# Patient Record
Sex: FEMALE | Race: WHITE | NOT HISPANIC OR LATINO | Employment: OTHER | URBAN - METROPOLITAN AREA
[De-identification: names, ages, dates, MRNs, and addresses within clinical notes are randomized per-mention and may not be internally consistent; named-entity substitution may affect disease eponyms.]

---

## 2020-06-09 ENCOUNTER — OFFICE VISIT (OUTPATIENT)
Dept: FAMILY MEDICINE CLINIC | Facility: CLINIC | Age: 60
End: 2020-06-09

## 2020-06-09 VITALS
HEIGHT: 64 IN | SYSTOLIC BLOOD PRESSURE: 122 MMHG | DIASTOLIC BLOOD PRESSURE: 68 MMHG | BODY MASS INDEX: 32.23 KG/M2 | OXYGEN SATURATION: 97 % | HEART RATE: 60 BPM | TEMPERATURE: 98 F | WEIGHT: 188.8 LBS

## 2020-06-09 DIAGNOSIS — N76.0 ACUTE VAGINITIS: Primary | ICD-10-CM

## 2020-06-09 DIAGNOSIS — A60.00 GENITAL HERPES SIMPLEX, UNSPECIFIED SITE: ICD-10-CM

## 2020-06-09 LAB
BACTERIAL VAGINOSIS VAG-IMP: NEGATIVE
BILIRUB BLD-MCNC: NEGATIVE MG/DL
EXPIRATION DATE: NORMAL
GLUCOSE UR STRIP-MCNC: NEGATIVE MG/DL
KETONES UR QL: NEGATIVE
LEUKOCYTE EST, POC: ABNORMAL
Lab: NORMAL
NITRITE UR-MCNC: NEGATIVE MG/ML
PH UR: 6 [PH] (ref 5–8)
PROT UR STRIP-MCNC: NEGATIVE MG/DL
RBC # UR STRIP: NEGATIVE /UL
SP GR UR: 1.03 (ref 1–1.03)
UROBILINOGEN UR QL: NORMAL

## 2020-06-09 PROCEDURE — 87510 GARDNER VAG DNA DIR PROBE: CPT | Performed by: NURSE PRACTITIONER

## 2020-06-09 PROCEDURE — 81003 URINALYSIS AUTO W/O SCOPE: CPT | Performed by: NURSE PRACTITIONER

## 2020-06-09 PROCEDURE — 99213 OFFICE O/P EST LOW 20 MIN: CPT | Performed by: NURSE PRACTITIONER

## 2020-06-09 RX ORDER — VALACYCLOVIR HYDROCHLORIDE 1 G/1
1000 TABLET, FILM COATED ORAL 2 TIMES DAILY
Qty: 10 TABLET | Refills: 0 | Status: SHIPPED | OUTPATIENT
Start: 2020-06-09 | End: 2020-08-24 | Stop reason: SDUPTHER

## 2020-06-09 NOTE — PATIENT INSTRUCTIONS
Herpes Simplex Test  Why am I having this test?  The herpes simplex test is used to check for an infection with the herpes simplex virus (HSV). There are two common types of HSV:  · Type 1 (HSV1) often causes cold sores on or around the mouth and sometimes on or around the eyes.  · Type 2 (HSV2) is a sexually transmitted infection that causes sores in and around the genitals.  You may need to have an HSV test if:  · Your health care provider thinks that you may have an HSV infection.  · You have a weakened body defense system (immune system) and you have sores around your mouth or genitals that look like HSV eruptions.  · You have sex with multiple partners, or your partner has genital herpes.  · You are pregnant, have herpes, and are expecting to deliver a baby vaginally in the next 6-8 weeks.  What is being tested?  There are two types of herpes simplex tests:  · Blood test. This test checks the sample for:  ? HSV antibodies. Antibodies are proteins that your body makes to help fight infection. This test checks whether antibodies against HSV are in your blood.  ? HSV antigens. This checks for the presence of the HSV virus (antigen) in your blood.  · Culture test. This test checks for the virus in a sample of fluid from an open sore. Culture tests take several days to complete but are very accurate.  What kind of sample is taken?         Samples will be collected according to the type of tests your health care provider orders.  · For the blood tests, a blood sample is usually collected by inserting a needle into a blood vessel.  · For a culture test, the sample is usually collected by swabbing the fluid that is coming from an open sore during an active infection (outbreak).  How are the results reported?  Your test results will be reported as either positive or negative. For this test, normal results are:  · Negative for HSV virus or antibodies in your blood.  · Negative for HSV virus in cultured fluid.  Sometimes,  the test results may report that a condition is present when it is not present (false-positive result).  Sometimes, the test results may report that a condition is not present when it is present (false-negative result).  What do the results mean?  · A positive result may indicate that you have an active HSV infection. The presence of HSV1 or HSV2 antigens or antibodies in your blood may indicate an active HSV infection.  · A negative result means that you do not have HSV1 or HSV2 virus in your blood. This may mean that you do not have HSV infection.  Talk with your health care provider about what your results mean.  Questions to ask your health care provider  Ask your health care provider, or the department that is doing the test:  · When will my results be ready?  · How will I get my results?  · What are my treatment options?  · What other tests do I need?  · What are my next steps?  Summary  · You may have this test if your health care provider suspects that you have a herpes simplex virus (HSV) infection.  · Type 1 (HSV1) often causes cold sores on or around the mouth and sometimes on or around the eyes. Type 2 (HSV2) is a sexually transmitted infection that causes sores in and around the genitals.  · The test may be done using a blood sample or a sample of fluid from an open sore.  · A positive result may mean that you have an active HSV infection. A negative result means that you probably do not have an active infection. Talk with your health care provider about what your results mean.  This information is not intended to replace advice given to you by your health care provider. Make sure you discuss any questions you have with your health care provider.  Document Released: 01/20/2006 Document Revised: 11/30/2018 Document Reviewed: 11/06/2018  Elsevier Patient Education © 2020 [employer]

## 2020-06-09 NOTE — PROGRESS NOTES
Subjective   Bebe Alfonso is a 60 y.o. female.     Chief Complaint   Patient presents with   • Vaginitis        History of Present Illness       Patient is here today with complaint of first noticed something going on in groin last Wednesday.  Vaginal Lip was swollen last Wednesday.  Then next day had BM and felt like swelling and tenderness went away. Then on Friday noticed something raw.  Feels like it looks different today than it did Friday. Took a picture on Friday and has to show me.     Denies vaginal discharge or itching.  Does report irritation.       OTC: neosporin.  Just feels like symptoms are outside on labial wall, not feeling like having any inside.        The following portions of the patient's history were reviewed and updated as appropriate: allergies, current medications, past family history, past medical history, past social history, past surgical history and problem list.    History reviewed. No pertinent past medical history.    Past Surgical History:   Procedure Laterality Date   • HYSTERECTOMY         Family History   Problem Relation Age of Onset   • Cancer Mother    • Anuerysm Father        Social History     Socioeconomic History   • Marital status:      Spouse name: Not on file   • Number of children: Not on file   • Years of education: Not on file   • Highest education level: Not on file   Tobacco Use   • Smoking status: Never Smoker   • Smokeless tobacco: Never Used   Substance and Sexual Activity   • Alcohol use: Yes     Alcohol/week: 1.0 standard drinks     Types: 1 Glasses of wine per week   • Drug use: Never         Current Outpatient Medications:   •  Bacitracin-Polymyxin B (NEOSPORIN) [id number] UNIT/GM ointment, Apply  topically., Disp: , Rfl:   •  Tetrahydrozoline-Zn Sulfate (EYE DROPS ALLERGY RELIEF OP), Apply  to eye(s) as directed by provider., Disp: , Rfl:   •  valACYclovir (VALTREX) 1000 MG tablet, Take 1 tablet by mouth 2 (Two) Times a Day., Disp: 10 tablet, Rfl:  "0    Review of Systems   Constitutional: Negative for fatigue and fever.   Respiratory: Positive for chest tightness (when breathing in on R side of chest). Negative for cough, shortness of breath and wheezing.    Cardiovascular: Negative for chest pain.   Gastrointestinal: Negative for abdominal pain, constipation, diarrhea, nausea and vomiting.   Genitourinary: Positive for frequency, genital sores (labia) and urgency. Negative for dysuria, vaginal bleeding and vaginal discharge. Vaginal pain: spot on labia.       Objective   Vitals:    06/09/20 0841   BP: 122/68   Pulse: 60   Temp: 98 °F (36.7 °C)   SpO2: 97%   Weight: 85.6 kg (188 lb 12.8 oz)   Height: 162.6 cm (64\")     Body mass index is 32.41 kg/m².  Physical Exam   Constitutional: She is oriented to person, place, and time. She appears well-developed and well-nourished.   Genitourinary:       Pelvic exam was performed with patient supine. There is lesion (#1 linear 3 lesions open, white, with pinkened and red karen area. no drainage noted) on the right labia.   Neurological: She is alert and oriented to person, place, and time.   Psychiatric: She has a normal mood and affect. Her behavior is normal. Judgment and thought content normal.         Assessment/Plan   Bebe was seen today for vaginitis.    Diagnoses and all orders for this visit:    Acute vaginitis  -     POC Urinalysis Dipstick, Automated  -     POCT Bacterial Vaginosis Rapid Test  -     Urine Culture - Urine, Urine, Clean Catch  -     Hepatitis panel, acute  -     HIV-1/O/2 ANTIGEN/ANTIBODY, 4TH GENERATION  -     HSV 1 and 2 IgM, Abs, Indirect  -     HSV 1 and 2-Specific Ab, IgG  -     RPR  -     Trichomonas vaginalis, PCR - , Urine, Clean Catch  -     Chlamydia trachomatis, Neisseria gonorrhoeae, PCR - Urine, Cervix    Genital herpes simplex, unspecified site    Other orders  -     valACYclovir (VALTREX) 1000 MG tablet; Take 1 tablet by mouth 2 (Two) Times a Day.      After physical assessment " patient states her ex- had diagnosis of herpes, but she didn't think she needed to be concerned because never had symptoms.  She would like testing for everything since this is shocking for her.  Denies symptoms.      We will go on and treat for presumptive herpes.  We discussed the diagnosis and treatment at length.      Follow up as needed.             Patient Instructions   Herpes Simplex Test  Why am I having this test?  The herpes simplex test is used to check for an infection with the herpes simplex virus (HSV). There are two common types of HSV:  · Type 1 (HSV1) often causes cold sores on or around the mouth and sometimes on or around the eyes.  · Type 2 (HSV2) is a sexually transmitted infection that causes sores in and around the genitals.  You may need to have an HSV test if:  · Your health care provider thinks that you may have an HSV infection.  · You have a weakened body defense system (immune system) and you have sores around your mouth or genitals that look like HSV eruptions.  · You have sex with multiple partners, or your partner has genital herpes.  · You are pregnant, have herpes, and are expecting to deliver a baby vaginally in the next 6-8 weeks.  What is being tested?  There are two types of herpes simplex tests:  · Blood test. This test checks the sample for:  ? HSV antibodies. Antibodies are proteins that your body makes to help fight infection. This test checks whether antibodies against HSV are in your blood.  ? HSV antigens. This checks for the presence of the HSV virus (antigen) in your blood.  · Culture test. This test checks for the virus in a sample of fluid from an open sore. Culture tests take several days to complete but are very accurate.  What kind of sample is taken?         Samples will be collected according to the type of tests your health care provider orders.  · For the blood tests, a blood sample is usually collected by inserting a needle into a blood vessel.  · For  a culture test, the sample is usually collected by swabbing the fluid that is coming from an open sore during an active infection (outbreak).  How are the results reported?  Your test results will be reported as either positive or negative. For this test, normal results are:  · Negative for HSV virus or antibodies in your blood.  · Negative for HSV virus in cultured fluid.  Sometimes, the test results may report that a condition is present when it is not present (false-positive result).  Sometimes, the test results may report that a condition is not present when it is present (false-negative result).  What do the results mean?  · A positive result may indicate that you have an active HSV infection. The presence of HSV1 or HSV2 antigens or antibodies in your blood may indicate an active HSV infection.  · A negative result means that you do not have HSV1 or HSV2 virus in your blood. This may mean that you do not have HSV infection.  Talk with your health care provider about what your results mean.  Questions to ask your health care provider  Ask your health care provider, or the department that is doing the test:  · When will my results be ready?  · How will I get my results?  · What are my treatment options?  · What other tests do I need?  · What are my next steps?  Summary  · You may have this test if your health care provider suspects that you have a herpes simplex virus (HSV) infection.  · Type 1 (HSV1) often causes cold sores on or around the mouth and sometimes on or around the eyes. Type 2 (HSV2) is a sexually transmitted infection that causes sores in and around the genitals.  · The test may be done using a blood sample or a sample of fluid from an open sore.  · A positive result may mean that you have an active HSV infection. A negative result means that you probably do not have an active infection. Talk with your health care provider about what your results mean.  This information is not intended to replace  advice given to you by your health care provider. Make sure you discuss any questions you have with your health care provider.  Document Released: 01/20/2006 Document Revised: 11/30/2018 Document Reviewed: 11/06/2018  Elsevier Patient Education © 2020 [employer]vier Inc.

## 2020-06-10 RX ORDER — VALACYCLOVIR HYDROCHLORIDE 1 G/1
TABLET, FILM COATED ORAL
Qty: 10 TABLET | Refills: 0 | OUTPATIENT
Start: 2020-06-10

## 2020-06-11 LAB
BACTERIA UR CULT: NORMAL
BACTERIA UR CULT: NORMAL
C TRACH RRNA SPEC QL NAA+PROBE: NEGATIVE
HAV IGM SERPL QL IA: NEGATIVE
HBV CORE IGM SERPL QL IA: NEGATIVE
HBV SURFACE AG SERPL QL IA: NEGATIVE
HCV AB S/CO SERPL IA: <0.1 S/CO RATIO (ref 0–0.9)
HIV 1+2 AB+HIV1 P24 AG SERPL QL IA: NON REACTIVE
HSV1 IGG SER IA-ACNC: <0.91 INDEX (ref 0–0.9)
HSV1 IGM TITR SER IF: NORMAL TITER
HSV2 IGG SER IA-ACNC: 3.78 INDEX (ref 0–0.9)
HSV2 IGG SERPL QL IA: POSITIVE
HSV2 IGM TITR SER IF: NORMAL TITER
N GONORRHOEA RRNA SPEC QL NAA+PROBE: NEGATIVE
RPR SER QL: NORMAL
T VAGINALIS DNA SPEC QL NAA+PROBE: NEGATIVE

## 2020-06-30 ENCOUNTER — TELEPHONE (OUTPATIENT)
Dept: FAMILY MEDICINE CLINIC | Facility: CLINIC | Age: 60
End: 2020-06-30

## 2020-07-02 ENCOUNTER — TELEPHONE (OUTPATIENT)
Dept: FAMILY MEDICINE CLINIC | Facility: CLINIC | Age: 60
End: 2020-07-02

## 2020-07-02 NOTE — TELEPHONE ENCOUNTER
Pt aware. She wishes to cancel her appointment at this time. I have cancelled it and instructed her to call us when she reschedules.

## 2020-07-02 NOTE — TELEPHONE ENCOUNTER
Lab work is only performed based on risk factors or medical complaints.  Without seeing the patient, I do not know what those risk factors and medical complaints might be and cannot just order labs.  She needs to be seen.

## 2020-07-02 NOTE — TELEPHONE ENCOUNTER
PT CALLED AND WOULD LIKE TO KNOW IF SHE CAN COME IN JUST FOR LAB WORK INSTEAD OF HAVING HER PHYSICAL THAT IS SCHEDULE FOR ON 7/14? PT SAYS THAT SHE IS NOT HAVING ANY CONCERNS RIGHT NOW.     CALL BACK # [phone]

## 2020-08-24 RX ORDER — VALACYCLOVIR HYDROCHLORIDE 1 G/1
1000 TABLET, FILM COATED ORAL 2 TIMES DAILY
Qty: 10 TABLET | Refills: 0 | Status: SHIPPED | OUTPATIENT
Start: 2020-08-24 | End: 2021-03-18

## 2020-08-24 NOTE — TELEPHONE ENCOUNTER
PATIENT WAS SEEN IN June FOR GENITAL HERPES, SHE HAS HAD ANOTHER OUTBREAK AND IS NEEDING A REFILL.

## 2020-08-25 RX ORDER — VALACYCLOVIR HYDROCHLORIDE 1 G/1
TABLET, FILM COATED ORAL
Qty: 10 TABLET | Refills: 0 | OUTPATIENT
Start: 2020-08-25

## 2021-03-18 ENCOUNTER — OFFICE VISIT (OUTPATIENT)
Dept: FAMILY MEDICINE CLINIC | Facility: CLINIC | Age: 61
End: 2021-03-18

## 2021-03-18 VITALS
HEIGHT: 64 IN | BODY MASS INDEX: 32.3 KG/M2 | TEMPERATURE: 97.5 F | OXYGEN SATURATION: 93 % | WEIGHT: 189.2 LBS | DIASTOLIC BLOOD PRESSURE: 64 MMHG | SYSTOLIC BLOOD PRESSURE: 128 MMHG | HEART RATE: 63 BPM

## 2021-03-18 DIAGNOSIS — K92.1 MELENA: ICD-10-CM

## 2021-03-18 DIAGNOSIS — R14.0 BLOATING: ICD-10-CM

## 2021-03-18 DIAGNOSIS — R19.7 DIARRHEA, UNSPECIFIED TYPE: Primary | ICD-10-CM

## 2021-03-18 DIAGNOSIS — K29.01 ACUTE GASTRITIS WITH HEMORRHAGE, UNSPECIFIED GASTRITIS TYPE: ICD-10-CM

## 2021-03-18 LAB
BILIRUB BLD-MCNC: NEGATIVE MG/DL
GLUCOSE UR STRIP-MCNC: NEGATIVE MG/DL
KETONES UR QL: NEGATIVE
LEUKOCYTE EST, POC: NEGATIVE
NITRITE UR-MCNC: NEGATIVE MG/ML
PH UR: 6 [PH] (ref 5–8)
PROT UR STRIP-MCNC: NEGATIVE MG/DL
RBC # UR STRIP: NEGATIVE /UL
SP GR UR: 1.02 (ref 1–1.03)
UROBILINOGEN UR QL: NORMAL

## 2021-03-18 PROCEDURE — 99214 OFFICE O/P EST MOD 30 MIN: CPT | Performed by: FAMILY MEDICINE

## 2021-03-18 PROCEDURE — 81003 URINALYSIS AUTO W/O SCOPE: CPT | Performed by: FAMILY MEDICINE

## 2021-03-18 RX ORDER — CLINDAMYCIN HYDROCHLORIDE 150 MG/1
150 CAPSULE ORAL 4 TIMES DAILY
COMMUNITY
End: 2021-06-16

## 2021-03-18 RX ORDER — CYCLOSPORINE 0.5 MG/ML
1 EMULSION OPHTHALMIC 2 TIMES DAILY
COMMUNITY

## 2021-03-18 RX ORDER — FAMOTIDINE 40 MG/1
40 TABLET, FILM COATED ORAL 2 TIMES DAILY
Qty: 60 TABLET | Refills: 1 | Status: SHIPPED | OUTPATIENT
Start: 2021-03-18 | End: 2022-04-14

## 2021-03-18 NOTE — PROGRESS NOTES
"Chief Complaint  Abdominal Pain (black stool ) and Follow-up (sore in mouth )    Subjective          Bebe Alfonso presents to [facility]  Patient is here today with a few new problems.  She was last seen by me at the old office 2 years ago.  She stated that over the last couple of months she has been noticing abdominal bloating.  Tenderness to palpation but no overt abdominal pain.  Before today she denied any nausea vomiting or diarrhea with those episodes.  However today she has had 3 black tarry diarrhea stools.  Bloating does improve after bowel movements today.  She denies any heartburn or acid reflux symptoms.    Her history is complicated by a recent trip to the emergency room for lower lip swelling.  They gave her IV steroids and within a couple hours her lip swelling improved.  The following day the patient noticed there was a bump on the inside on the mucous membranes of her lower lip.  She pressed on it and it expelled some whitish fluid as she was started on clindamycin 4 days ago by her dentist.  But she pushed on it today and also had whitish fluid draining from it.  Patient denies any swelling or pain in the area.  It is not red or swollen.  She denies any swollen lymph nodes or sore throat.    Patient also requesting for her wounds to be tested and her vitamins and minerals.  She is requesting this because she did some \"research online\" about rosacea and stated it is linked to hormonal problems and vitamin deficiencies.  Although she denies any neuropathy, fatigue, chronic diarrhea and she does not take antacids.      Objective   Vital Signs:   /64   Pulse 63   Temp 97.5 °F (36.4 °C)   Ht 162.6 cm (64.02\")   Wt 85.8 kg (189 lb 3.2 oz)   SpO2 93%   BMI 32.46 kg/m²     Physical Exam  Vitals and nursing note reviewed.   Constitutional:       Appearance: Normal appearance. She is well-developed.   HENT:      Head: Normocephalic and atraumatic.      Mouth/Throat: "      Comments: Normal-appearing salivary gland in the area of the lower lip frenulum.  When pressure is applied a whitish fluid is admitted.  There is no swelling, erythema or induration in the area  Eyes:      Conjunctiva/sclera: Conjunctivae normal.   Cardiovascular:      Rate and Rhythm: Normal rate and regular rhythm.      Heart sounds: Normal heart sounds.   Pulmonary:      Effort: Pulmonary effort is normal.      Breath sounds: Normal breath sounds.   Abdominal:      General: Bowel sounds are normal.      Palpations: Abdomen is soft.   Musculoskeletal:         General: Normal range of motion.      Cervical back: Normal range of motion and neck supple.   Skin:     General: Skin is warm and dry.      Capillary Refill: Capillary refill takes less than 2 seconds.      Findings: No rash.   Neurological:      Mental Status: She is alert and oriented to person, place, and time.   Psychiatric:         Attention and Perception: Attention normal.         Mood and Affect: Mood is anxious. Affect is tearful.         Speech: Speech normal.         Behavior: Behavior normal.         Thought Content: Thought content normal.         Judgment: Judgment normal.        Result Review :   The following data was reviewed by: Alida Caputo DO on 03/18/2021:                    Assessment and Plan    Diagnoses and all orders for this visit:    1. Diarrhea, unspecified type (Primary)  -     CBC & Differential  -     POC Urinalysis Dipstick, Automated  -     Comprehensive Metabolic Panel  -     Vitamin B12  -     TSH  -     Magnesium  -     Cancel: H. Pylori Breath Test - Breath, Lung; Future  -     H. Pylori Breath Test - Breath, Lung    2. Melena  -     CBC & Differential  -     POC Urinalysis Dipstick, Automated  -     Comprehensive Metabolic Panel  -     Vitamin B12  -     TSH  -     Magnesium  -     Cancel: H. Pylori Breath Test - Breath, Lung; Future  -     H. Pylori Breath Test - Breath, Lung    3. Bloating  -     CBC &  Differential  -     POC Urinalysis Dipstick, Automated  -     Comprehensive Metabolic Panel  -     Vitamin B12  -     TSH  -     Magnesium  -     Cancel: H. Pylori Breath Test - Breath, Lung; Future  -     H. Pylori Breath Test - Breath, Lung    4. Acute gastritis with hemorrhage, unspecified gastritis type  -     famotidine (Pepcid) 40 MG tablet; Take 1 tablet by mouth 2 (Two) Times a Day.  Dispense: 60 tablet; Refill: 1    Although the patient had several new symptoms and complaints today I think the most pressing is the black tarry stools.  The most likely diagnosis is acute gastritis.  Check H. pylori.  I will check H&H on labs today.  I have given the patient iFOBT's to return when able.  Start famotidine 40 mg twice daily.    Also it is likely that the patient had a inflamed salivary gland and there is absolutely no abnormal findings at this time.  She may discontinue clindamycin.    I will check electrolytes and TSH per the patient's request.      Follow Up   Return for Annual physical.  Patient was given instructions and counseling regarding her condition or for health maintenance advice. Please see specific information pulled into the AVS if appropriate.

## 2021-03-19 LAB
ALBUMIN SERPL-MCNC: 4.1 G/DL (ref 3.8–4.8)
ALBUMIN/GLOB SERPL: 1.4 {RATIO} (ref 1.2–2.2)
ALP SERPL-CCNC: 93 IU/L (ref 39–117)
ALT SERPL-CCNC: 21 IU/L (ref 0–32)
AST SERPL-CCNC: 22 IU/L (ref 0–40)
BASOPHILS # BLD AUTO: 0.1 X10E3/UL (ref 0–0.2)
BASOPHILS NFR BLD AUTO: 1 %
BILIRUB SERPL-MCNC: 0.5 MG/DL (ref 0–1.2)
BUN SERPL-MCNC: 12 MG/DL (ref 8–27)
BUN/CREAT SERPL: 15 (ref 12–28)
CALCIUM SERPL-MCNC: 9.5 MG/DL (ref 8.7–10.3)
CHLORIDE SERPL-SCNC: 104 MMOL/L (ref 96–106)
CO2 SERPL-SCNC: 26 MMOL/L (ref 20–29)
CREAT SERPL-MCNC: 0.78 MG/DL (ref 0.57–1)
EOSINOPHIL # BLD AUTO: 0.1 X10E3/UL (ref 0–0.4)
EOSINOPHIL NFR BLD AUTO: 2 %
ERYTHROCYTE [DISTWIDTH] IN BLOOD BY AUTOMATED COUNT: 12.1 % (ref 11.7–15.4)
GLOBULIN SER CALC-MCNC: 3 G/DL (ref 1.5–4.5)
GLUCOSE SERPL-MCNC: 88 MG/DL (ref 65–99)
HCT VFR BLD AUTO: 42.9 % (ref 34–46.6)
HGB BLD-MCNC: 14.6 G/DL (ref 11.1–15.9)
IMM GRANULOCYTES # BLD AUTO: 0 X10E3/UL (ref 0–0.1)
IMM GRANULOCYTES NFR BLD AUTO: 0 %
LYMPHOCYTES # BLD AUTO: 1.6 X10E3/UL (ref 0.7–3.1)
LYMPHOCYTES NFR BLD AUTO: 27 %
MAGNESIUM SERPL-MCNC: 2.4 MG/DL (ref 1.6–2.3)
MCH RBC QN AUTO: 30 PG (ref 26.6–33)
MCHC RBC AUTO-ENTMCNC: 34 G/DL (ref 31.5–35.7)
MCV RBC AUTO: 88 FL (ref 79–97)
MONOCYTES # BLD AUTO: 0.3 X10E3/UL (ref 0.1–0.9)
MONOCYTES NFR BLD AUTO: 5 %
NEUTROPHILS # BLD AUTO: 3.8 X10E3/UL (ref 1.4–7)
NEUTROPHILS NFR BLD AUTO: 65 %
PLATELET # BLD AUTO: 353 X10E3/UL (ref 150–450)
POTASSIUM SERPL-SCNC: 4.7 MMOL/L (ref 3.5–5.2)
PROT SERPL-MCNC: 7.1 G/DL (ref 6–8.5)
RBC # BLD AUTO: 4.87 X10E6/UL (ref 3.77–5.28)
SODIUM SERPL-SCNC: 142 MMOL/L (ref 134–144)
TSH SERPL DL<=0.005 MIU/L-ACNC: 3.56 UIU/ML (ref 0.45–4.5)
UREA BREATH TEST QL: NEGATIVE
VIT B12 SERPL-MCNC: 472 PG/ML (ref 232–1245)
WBC # BLD AUTO: 5.9 X10E3/UL (ref 3.4–10.8)

## 2021-03-22 ENCOUNTER — BULK ORDERING (OUTPATIENT)
Dept: CASE MANAGEMENT | Facility: OTHER | Age: 61
End: 2021-03-22

## 2021-03-22 DIAGNOSIS — Z23 IMMUNIZATION DUE: ICD-10-CM

## 2021-03-23 ENCOUNTER — CLINICAL SUPPORT (OUTPATIENT)
Dept: FAMILY MEDICINE CLINIC | Facility: CLINIC | Age: 61
End: 2021-03-23

## 2021-03-23 VITALS — TEMPERATURE: 98.6 F

## 2021-03-23 DIAGNOSIS — K92.1 BLACK STOOLS: Primary | ICD-10-CM

## 2021-03-23 LAB
HEMOCCULT STL QL IA: NEGATIVE
HEMOCCULT STL QL IA: NEGATIVE

## 2021-03-23 PROCEDURE — 82274 ASSAY TEST FOR BLOOD FECAL: CPT | Performed by: FAMILY MEDICINE

## 2021-04-13 ENCOUNTER — OFFICE VISIT (OUTPATIENT)
Dept: FAMILY MEDICINE CLINIC | Facility: CLINIC | Age: 61
End: 2021-04-13

## 2021-04-13 ENCOUNTER — TELEPHONE (OUTPATIENT)
Dept: FAMILY MEDICINE CLINIC | Facility: CLINIC | Age: 61
End: 2021-04-13

## 2021-04-13 VITALS
DIASTOLIC BLOOD PRESSURE: 78 MMHG | WEIGHT: 186 LBS | HEART RATE: 68 BPM | SYSTOLIC BLOOD PRESSURE: 114 MMHG | OXYGEN SATURATION: 97 % | HEIGHT: 64 IN | TEMPERATURE: 98 F | BODY MASS INDEX: 31.76 KG/M2

## 2021-04-13 DIAGNOSIS — Z13.6 ENCOUNTER FOR LIPID SCREENING FOR CARDIOVASCULAR DISEASE: ICD-10-CM

## 2021-04-13 DIAGNOSIS — Z78.0 MENOPAUSE: ICD-10-CM

## 2021-04-13 DIAGNOSIS — Z11.59 NEED FOR HEPATITIS C SCREENING TEST: ICD-10-CM

## 2021-04-13 DIAGNOSIS — Z13.220 ENCOUNTER FOR LIPID SCREENING FOR CARDIOVASCULAR DISEASE: ICD-10-CM

## 2021-04-13 DIAGNOSIS — Z13.820 ENCOUNTER FOR SCREENING FOR OSTEOPOROSIS: ICD-10-CM

## 2021-04-13 DIAGNOSIS — Z00.00 ENCOUNTER FOR WELLNESS EXAMINATION IN ADULT: Primary | ICD-10-CM

## 2021-04-13 PROCEDURE — 99396 PREV VISIT EST AGE 40-64: CPT | Performed by: FAMILY MEDICINE

## 2021-04-13 NOTE — TELEPHONE ENCOUNTER
----- Message from Alida Caputo DO sent at 4/13/2021 10:37 AM EDT -----  Please get records of the patient's last colonoscopy done in [place] by Dr. Domingo April 2019.
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
## 2021-04-14 LAB
CHOLEST SERPL-MCNC: 195 MG/DL (ref 0–200)
HCV AB S/CO SERPL IA: <0.1 S/CO RATIO (ref 0–0.9)
HDLC SERPL-MCNC: 54 MG/DL (ref 40–60)
LDLC SERPL CALC-MCNC: 123 MG/DL (ref 0–100)
TRIGL SERPL-MCNC: 98 MG/DL (ref 0–150)
VLDLC SERPL CALC-MCNC: 18 MG/DL (ref 5–40)

## 2021-04-30 ENCOUNTER — TELEPHONE (OUTPATIENT)
Dept: FAMILY MEDICINE CLINIC | Facility: CLINIC | Age: 61
End: 2021-04-30

## 2021-06-02 ENCOUNTER — TELEPHONE (OUTPATIENT)
Dept: FAMILY MEDICINE CLINIC | Facility: CLINIC | Age: 61
End: 2021-06-02

## 2021-06-02 NOTE — TELEPHONE ENCOUNTER
Called pt she had dexa and mammo done at [facility] will call for records. [facility] appt with dr israel for ref

## 2021-06-02 NOTE — TELEPHONE ENCOUNTER
Caller: Bebe Alfonso    Relationship: Self    Best call back number:[phone]    What is the best time to reach you: ANYTIME    Who are you requesting to speak with NURSE     What was the call regarding: PATIENT IS WANTING A REFERRAL TO RHEUMATOLOGIST AND WOULD LIKE TO KNOW IF SHE IS NEEDING TEST DONE  BEFORE MAKING THIS REFERRAL.     PATIENT HAD  MAMMOGRAM AND BONE DENSITY TEST AND WAS WONDERING IF YOU HAD RESULTS.     Do you require a callback: YES

## 2021-06-03 DIAGNOSIS — Z13.820 ENCOUNTER FOR SCREENING FOR OSTEOPOROSIS: ICD-10-CM

## 2021-06-03 DIAGNOSIS — Z78.0 MENOPAUSE: ICD-10-CM

## 2021-06-03 NOTE — TELEPHONE ENCOUNTER
Pts Dexa scan and Mammogram results are in chart. Please advise.    Please advise on if pt will need to be seen for referral to rheumatologist, or if she will need labs done before referral is enterded

## 2021-06-03 NOTE — TELEPHONE ENCOUNTER
Please let the patient know that her mammogram is negative and a 1 year follow-up is recommended.  Also her DEXA scan shows osteopenia and she should be doing weightbearing exercise as well as maintaining a healthy diet.  As far as the referral I would need to discuss the details of why she needs to see the rheumatologist with the patient so the best thing to do is to have her make an appointment

## 2021-06-16 ENCOUNTER — OFFICE VISIT (OUTPATIENT)
Dept: FAMILY MEDICINE CLINIC | Facility: CLINIC | Age: 61
End: 2021-06-16

## 2021-06-16 VITALS
TEMPERATURE: 98.2 F | BODY MASS INDEX: 32.2 KG/M2 | HEIGHT: 64 IN | OXYGEN SATURATION: 98 % | HEART RATE: 61 BPM | SYSTOLIC BLOOD PRESSURE: 126 MMHG | DIASTOLIC BLOOD PRESSURE: 74 MMHG | WEIGHT: 188.6 LBS

## 2021-06-16 DIAGNOSIS — M25.50 PAIN IN JOINT INVOLVING MULTIPLE SITES: Primary | ICD-10-CM

## 2021-06-16 PROCEDURE — 99214 OFFICE O/P EST MOD 30 MIN: CPT | Performed by: FAMILY MEDICINE

## 2021-06-16 RX ORDER — ERGOCALCIFEROL (VITAMIN D2) 10 MCG
400 TABLET ORAL DAILY
COMMUNITY
End: 2022-04-14

## 2021-06-16 NOTE — PROGRESS NOTES
"Chief Complaint  Lower Extremity Issue (joint and ankle pain)    Subjective     {Problem List  Visit Diagnosis   Encounters  Notes  Medications  Labs  Result Review Imaging  Media :23}     Bebe Alfonso presents to [facility]  Patient is here today to discuss a new problem of joint pain.  Over the past few months she has had multiple joints that are painful, stiff and swollen.  The first time that she noticed was her left elbow.  Then she also started having left ankle pain and stiffness for approximately 4 days until it resolved on its own only to return 2 weeks later, no redness or swelling but + stiffness lasting about 2 weeks.  The left ankle pain returned again about 1 week ago and is now intermittent but in the anterior portion of the left ankle.  There is still no swelling or redness or bruising noted.  Patient states that the pain is worse after rest when she takes the first few steps.  She also started having R hip pain again with referred pain down the back of the RLE in the past 2 weeks.  She has had similar right hip pain in the past intermittently.  Patient denies any history of rheumatoid arthritis or lupus in her family.  A couple of weeks ago the patient has also started having some intermittent      Objective   Vital Signs:   /74   Pulse 61   Temp 98.2 °F (36.8 °C)   Ht 162.6 cm (64\")   Wt 85.5 kg (188 lb 9.6 oz)   SpO2 98%   BMI 32.37 kg/m²     Physical Exam  Vitals and nursing note reviewed.   Constitutional:       Appearance: Normal appearance. She is well-developed. She is obese.      Comments: Patient is intermittently tearful when she is explaining the pain in her joints.   HENT:      Head: Normocephalic and atraumatic.   Eyes:      Conjunctiva/sclera: Conjunctivae normal.   Cardiovascular:      Rate and Rhythm: Normal rate and regular rhythm.      Heart sounds: Normal heart sounds.   Pulmonary:      Effort: Pulmonary effort is normal.      " Breath sounds: Normal breath sounds.   Musculoskeletal:         General: Normal range of motion.      Cervical back: Normal range of motion and neck supple.      Comments: Slightly puffy ankles but no true pitting edema.  No joint effusions appreciated.   Skin:     General: Skin is warm and dry.      Capillary Refill: Capillary refill takes less than 2 seconds.      Findings: No rash.   Neurological:      General: No focal deficit present.      Mental Status: She is alert and oriented to person, place, and time.      Sensory: No sensory deficit.      Motor: No weakness.      Coordination: Coordination normal.      Gait: Gait normal.      Deep Tendon Reflexes: Reflexes normal.   Psychiatric:         Mood and Affect: Mood normal.         Behavior: Behavior normal.         Thought Content: Thought content normal.         Judgment: Judgment normal.        Result Review :   The following data was reviewed by: Alida Caputo DO on 06/16/2021:  Common labs    Common Labsle 3/18/21 3/18/21 4/13/21    1634 1634    Glucose  88    BUN  12    Creatinine  0.78    eGFR Non  Am  81    eGFR  Am  94    Sodium  142    Potassium  4.7    Chloride  104    Calcium  9.5    Total Protein  7.1    Albumin  4.1    Total Bilirubin  0.5    Alkaline Phosphatase  93    AST (SGOT)  22    ALT (SGPT)  21    WBC 5.9     Hemoglobin 14.6     Hematocrit 42.9     Platelets 353     Total Cholesterol   195   Triglycerides   98   HDL Cholesterol   54   LDL Cholesterol    123 (A)   (A) Abnormal value       Comments are available for some flowsheets but are not being displayed.           TSH    TSH 3/18/21   TSH 3.560                     Assessment and Plan    Diagnoses and all orders for this visit:    1. Pain in joint involving multiple sites (Primary)  -     CBC & Differential  -     Comprehensive Metabolic Panel  -     TSH  -     Uric Acid  -     C-reactive Protein  -     Sedimentation rate, automated  -     CK  -     ARCHIE by IFA, Reflex  9-biomarkers profile  -     Rheumatoid Factor, Quant    Patient is seen today with a new complaint of pain in multiple joints intermittently.  I would like to check rheumatoid panel labs.  However my suspicion is low of a rheumatologic problem and due to the lack of swelling or redness in her painful joints.  I discussed the possibility of fibromyalgia and treating with Cymbalta.  Patient will think about it and let me know which she would like to do.  If all labs are normal I would advise she try the Cymbalta.  We discussed side effects and benefits and the patient will let me know if she would like to try it in the future.      Follow Up   No follow-ups on file.  Patient was given instructions and counseling regarding her condition or for health maintenance advice. Please see specific information pulled into the AVS if appropriate.

## 2021-06-19 LAB
ALBUMIN SERPL-MCNC: 4.5 G/DL (ref 3.5–5.2)
ALBUMIN/GLOB SERPL: 1.7 G/DL
ALP SERPL-CCNC: 84 U/L (ref 39–117)
ALT SERPL-CCNC: 17 U/L (ref 1–33)
ANA HOMOGEN TITR SER: ABNORMAL {TITER}
ANA TITR SER IF: POSITIVE {TITER}
AST SERPL-CCNC: 16 U/L (ref 1–32)
BASOPHILS # BLD AUTO: 0.05 10*3/MM3 (ref 0–0.2)
BASOPHILS NFR BLD AUTO: 1.1 % (ref 0–1.5)
BILIRUB SERPL-MCNC: 0.6 MG/DL (ref 0–1.2)
BUN SERPL-MCNC: 11 MG/DL (ref 8–23)
BUN/CREAT SERPL: 13.1 (ref 7–25)
CALCIUM SERPL-MCNC: 9.8 MG/DL (ref 8.6–10.5)
CENTROMERE B AB SER-ACNC: <0.2 AI (ref 0–0.9)
CHLORIDE SERPL-SCNC: 102 MMOL/L (ref 98–107)
CHROMATIN AB SERPL-ACNC: <0.2 AI (ref 0–0.9)
CK SERPL-CCNC: 51 U/L (ref 20–180)
CO2 SERPL-SCNC: 26.7 MMOL/L (ref 22–29)
CREAT SERPL-MCNC: 0.84 MG/DL (ref 0.57–1)
CRP SERPL-MCNC: 0.43 MG/DL (ref 0–0.5)
DSDNA AB SER-ACNC: <1 IU/ML (ref 0–9)
ENA JO1 AB SER-ACNC: <0.2 AI (ref 0–0.9)
ENA RNP AB SER-ACNC: 0.9 AI (ref 0–0.9)
ENA SCL70 AB SER-ACNC: <0.2 AI (ref 0–0.9)
ENA SM AB SER-ACNC: <0.2 AI (ref 0–0.9)
ENA SS-A AB SER-ACNC: <0.2 AI (ref 0–0.9)
ENA SS-B AB SER-ACNC: <0.2 AI (ref 0–0.9)
EOSINOPHIL # BLD AUTO: 0.12 10*3/MM3 (ref 0–0.4)
EOSINOPHIL NFR BLD AUTO: 2.8 % (ref 0.3–6.2)
ERYTHROCYTE [DISTWIDTH] IN BLOOD BY AUTOMATED COUNT: 12.3 % (ref 12.3–15.4)
ERYTHROCYTE [SEDIMENTATION RATE] IN BLOOD BY WESTERGREN METHOD: 14 MM/HR (ref 0–30)
GLOBULIN SER CALC-MCNC: 2.6 GM/DL
GLUCOSE SERPL-MCNC: 102 MG/DL (ref 65–99)
HCT VFR BLD AUTO: 43.3 % (ref 34–46.6)
HGB BLD-MCNC: 14.6 G/DL (ref 12–15.9)
IMM GRANULOCYTES # BLD AUTO: 0.01 10*3/MM3 (ref 0–0.05)
IMM GRANULOCYTES NFR BLD AUTO: 0.2 % (ref 0–0.5)
LABORATORY COMMENT REPORT: ABNORMAL
LYMPHOCYTES # BLD AUTO: 1.14 10*3/MM3 (ref 0.7–3.1)
LYMPHOCYTES NFR BLD AUTO: 26.1 % (ref 19.6–45.3)
Lab: ABNORMAL
Lab: ABNORMAL
MCH RBC QN AUTO: 29.4 PG (ref 26.6–33)
MCHC RBC AUTO-ENTMCNC: 33.7 G/DL (ref 31.5–35.7)
MCV RBC AUTO: 87.1 FL (ref 79–97)
MONOCYTES # BLD AUTO: 0.31 10*3/MM3 (ref 0.1–0.9)
MONOCYTES NFR BLD AUTO: 7.1 % (ref 5–12)
NEUTROPHILS # BLD AUTO: 2.73 10*3/MM3 (ref 1.7–7)
NEUTROPHILS NFR BLD AUTO: 62.7 % (ref 42.7–76)
NRBC BLD AUTO-RTO: 0 /100 WBC (ref 0–0.2)
PLATELET # BLD AUTO: 345 10*3/MM3 (ref 140–450)
POTASSIUM SERPL-SCNC: 4.6 MMOL/L (ref 3.5–5.2)
PROT SERPL-MCNC: 7.1 G/DL (ref 6–8.5)
RBC # BLD AUTO: 4.97 10*6/MM3 (ref 3.77–5.28)
RHEUMATOID FACT SERPL-ACNC: 330.6 IU/ML (ref 0–13.9)
SODIUM SERPL-SCNC: 138 MMOL/L (ref 136–145)
TSH SERPL DL<=0.005 MIU/L-ACNC: 3.4 UIU/ML (ref 0.27–4.2)
URATE SERPL-MCNC: 5.4 MG/DL (ref 2.4–5.7)
WBC # BLD AUTO: 4.36 10*3/MM3 (ref 3.4–10.8)

## 2021-06-23 LAB
CCP IGA+IGG SERPL IA-ACNC: >250 UNITS (ref 0–19)
WRITTEN AUTHORIZATION: NORMAL

## 2021-06-28 ENCOUNTER — TELEPHONE (OUTPATIENT)
Dept: FAMILY MEDICINE CLINIC | Facility: CLINIC | Age: 61
End: 2021-06-28

## 2021-06-28 DIAGNOSIS — M25.50 PAIN IN JOINT INVOLVING MULTIPLE SITES: Primary | ICD-10-CM

## 2021-06-28 NOTE — TELEPHONE ENCOUNTER
Caller: Bebe Alfonso    Relationship: Self    Best call back number: [phone]    What test was performed: LABS    Additional notes: PATIENT IS CONCERNED DUE TO THE LENGTH OF TIME THAT HAS WENT BY SINCE SHE HAD THE RESULTS IN MYCHART AND SHE WOULD LIKE SOMEONE TO CALL [facility] TO GO OVER THEM. PLEASE CALL AND ADVISE.

## 2021-06-28 NOTE — TELEPHONE ENCOUNTER
I dont see any comment from you after the ccp test was added, I can call with her results after you result her labs

## 2022-04-14 ENCOUNTER — OFFICE VISIT (OUTPATIENT)
Dept: FAMILY MEDICINE CLINIC | Facility: CLINIC | Age: 62
End: 2022-04-14

## 2022-04-14 VITALS
TEMPERATURE: 96.8 F | OXYGEN SATURATION: 98 % | HEART RATE: 60 BPM | HEIGHT: 64 IN | SYSTOLIC BLOOD PRESSURE: 110 MMHG | BODY MASS INDEX: 32.64 KG/M2 | DIASTOLIC BLOOD PRESSURE: 68 MMHG | WEIGHT: 191.2 LBS

## 2022-04-14 DIAGNOSIS — Z00.00 ENCOUNTER FOR WELLNESS EXAMINATION IN ADULT: Primary | ICD-10-CM

## 2022-04-14 PROCEDURE — 99396 PREV VISIT EST AGE 40-64: CPT | Performed by: FAMILY MEDICINE

## 2022-04-14 RX ORDER — HYDROXYCHLOROQUINE SULFATE 200 MG/1
TABLET, FILM COATED ORAL EVERY 12 HOURS SCHEDULED
COMMUNITY
Start: 2021-09-15
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
## 2022-05-19 ENCOUNTER — TELEPHONE (OUTPATIENT)
Dept: FAMILY MEDICINE CLINIC | Facility: CLINIC | Age: 62
End: 2022-05-19

## 2022-05-19 DIAGNOSIS — Z13.6 ENCOUNTER FOR LIPID SCREENING FOR CARDIOVASCULAR DISEASE: Primary | ICD-10-CM

## 2022-05-19 DIAGNOSIS — Z13.220 ENCOUNTER FOR LIPID SCREENING FOR CARDIOVASCULAR DISEASE: Primary | ICD-10-CM

## 2022-05-19 DIAGNOSIS — Z13.1 SCREENING FOR DIABETES MELLITUS: ICD-10-CM

## 2022-05-19 NOTE — TELEPHONE ENCOUNTER
PT CALLED AND IS WANTING LABS DONE.  SHE IS STATING THAT HER INSURANCE INFORMED HER THAT SHE NEEDED THEM DONE BACK IN April WHEN SHE HAD HER PHYSICAL AND PT IS STATING THAT SHE AGREES.  SHE IS STATING THAT YOU TOLD HER THAT SHE DID NOT NEED LABS DONE BECAUSE HER LABS WERE GOOD LAST YEAR.  INFORMED HER THAT I WOULD ASK YOU TO PUT SOME IN AND SHE WOULD LIKE A CALL BACK TO KNOW WHEN THEY ARE PUT IN AND WHAT LABS YOU ARE DOING

## 2022-05-25 ENCOUNTER — TELEPHONE (OUTPATIENT)
Dept: FAMILY MEDICINE CLINIC | Facility: CLINIC | Age: 62
End: 2022-05-25

## 2022-05-25 DIAGNOSIS — Z00.00 ENCOUNTER FOR WELLNESS EXAMINATION IN ADULT: Primary | ICD-10-CM

## 2022-05-25 NOTE — TELEPHONE ENCOUNTER
Contacted patient and informed her that the last time her thyroid was tested was less than a year ago.  Therefore the patient elected not to recheck TSH at this time.  She will call to schedule a lab visit.

## 2022-05-25 NOTE — TELEPHONE ENCOUNTER
PATIENT CALLED BACK REGARDING LABS. SHE WANTED TO KNOW WHAT LABS WERE PUT IN. WHEN I TOLD HER THE CMP AND LIPIDS SHE ASKED ABOUT A TSH. I TOLD HER THERE WASN'T A TSH ORDER PLACED. SHE STATED THAT SHE WAS IRRITATED AND THAT SHE WOULD CALL IF SHE WANTS TO SCHEDULE THESE LABS.

## 2022-08-09 ENCOUNTER — TELEPHONE (OUTPATIENT)
Dept: FAMILY MEDICINE CLINIC | Facility: CLINIC | Age: 62
End: 2022-08-09

## 2022-08-09 NOTE — TELEPHONE ENCOUNTER
Left message per verbal release about overdue labs.  Advised her to call the office and schedule lab appt.  I will postpone these orders for 2 weeks

## 2024-05-13 ENCOUNTER — TRANSCRIBE ORDERS (OUTPATIENT)
Dept: CT IMAGING | Facility: CLINIC | Age: 64
End: 2024-05-13
Payer: MEDICARE

## 2024-05-13 DIAGNOSIS — M25.572 LEFT ANKLE PAIN, UNSPECIFIED CHRONICITY: Primary | ICD-10-CM
